# Patient Record
Sex: MALE | Race: WHITE | NOT HISPANIC OR LATINO | Employment: FULL TIME | URBAN - METROPOLITAN AREA
[De-identification: names, ages, dates, MRNs, and addresses within clinical notes are randomized per-mention and may not be internally consistent; named-entity substitution may affect disease eponyms.]

---

## 2022-04-10 ENCOUNTER — APPOINTMENT (EMERGENCY)
Dept: RADIOLOGY | Facility: HOSPITAL | Age: 47
End: 2022-04-10
Payer: COMMERCIAL

## 2022-04-10 ENCOUNTER — HOSPITAL ENCOUNTER (EMERGENCY)
Facility: HOSPITAL | Age: 47
Discharge: HOME | End: 2022-04-11
Attending: EMERGENCY MEDICINE | Admitting: EMERGENCY MEDICINE
Payer: COMMERCIAL

## 2022-04-10 VITALS
HEIGHT: 68 IN | HEART RATE: 78 BPM | DIASTOLIC BLOOD PRESSURE: 74 MMHG | TEMPERATURE: 98.4 F | WEIGHT: 200 LBS | RESPIRATION RATE: 17 BRPM | BODY MASS INDEX: 30.31 KG/M2 | OXYGEN SATURATION: 95 % | SYSTOLIC BLOOD PRESSURE: 149 MMHG

## 2022-04-10 DIAGNOSIS — R07.9 CHEST PAIN: Primary | ICD-10-CM

## 2022-04-10 LAB
ALBUMIN SERPL BCP-MCNC: 4.6 G/DL (ref 3.5–5)
ALP SERPL-CCNC: 56 U/L (ref 46–116)
ALT SERPL W P-5'-P-CCNC: 46 U/L (ref 12–78)
ANION GAP SERPL CALCULATED.3IONS-SCNC: 11 MMOL/L (ref 4–13)
AST SERPL W P-5'-P-CCNC: 27 U/L (ref 5–45)
BASOPHILS # BLD AUTO: 0.03 THOUSANDS/ΜL (ref 0–0.1)
BASOPHILS NFR BLD AUTO: 1 % (ref 0–1)
BILIRUB DIRECT SERPL-MCNC: 0.2 MG/DL (ref 0–0.2)
BILIRUB SERPL-MCNC: 0.64 MG/DL (ref 0.2–1)
BUN SERPL-MCNC: 19 MG/DL (ref 5–25)
CALCIUM SERPL-MCNC: 8.9 MG/DL (ref 8.3–10.1)
CARDIAC TROPONIN I PNL SERPL HS: 3 NG/L
CHLORIDE SERPL-SCNC: 103 MMOL/L (ref 100–108)
CO2 SERPL-SCNC: 28 MMOL/L (ref 21–32)
CREAT SERPL-MCNC: 1.2 MG/DL (ref 0.6–1.3)
EOSINOPHIL # BLD AUTO: 0.03 THOUSAND/ΜL (ref 0–0.61)
EOSINOPHIL NFR BLD AUTO: 1 % (ref 0–6)
ERYTHROCYTE [DISTWIDTH] IN BLOOD BY AUTOMATED COUNT: 12 % (ref 11.6–15.1)
GFR SERPL CREATININE-BSD FRML MDRD: 72 ML/MIN/1.73SQ M
GLUCOSE SERPL-MCNC: 127 MG/DL (ref 65–140)
HCT VFR BLD AUTO: 44.1 % (ref 36.5–49.3)
HGB BLD-MCNC: 15.7 G/DL (ref 12–17)
IMM GRANULOCYTES # BLD AUTO: 0.01 THOUSAND/UL (ref 0–0.2)
IMM GRANULOCYTES NFR BLD AUTO: 0 % (ref 0–2)
LYMPHOCYTES # BLD AUTO: 0.77 THOUSANDS/ΜL (ref 0.6–4.47)
LYMPHOCYTES NFR BLD AUTO: 21 % (ref 14–44)
MCH RBC QN AUTO: 32.2 PG (ref 26.8–34.3)
MCHC RBC AUTO-ENTMCNC: 35.6 G/DL (ref 31.4–37.4)
MCV RBC AUTO: 90 FL (ref 82–98)
MONOCYTES # BLD AUTO: 0.4 THOUSAND/ΜL (ref 0.17–1.22)
MONOCYTES NFR BLD AUTO: 11 % (ref 4–12)
NEUTROPHILS # BLD AUTO: 2.52 THOUSANDS/ΜL (ref 1.85–7.62)
NEUTS SEG NFR BLD AUTO: 66 % (ref 43–75)
NRBC BLD AUTO-RTO: 0 /100 WBCS
PLATELET # BLD AUTO: 153 THOUSANDS/UL (ref 149–390)
PMV BLD AUTO: 9.4 FL (ref 8.9–12.7)
POTASSIUM SERPL-SCNC: 3.9 MMOL/L (ref 3.5–5.3)
PROT SERPL-MCNC: 7.9 G/DL (ref 6.4–8.2)
RBC # BLD AUTO: 4.88 MILLION/UL (ref 3.88–5.62)
SARS-COV-2 AG UPPER RESP QL IA: NORMAL
SODIUM SERPL-SCNC: 142 MMOL/L (ref 136–145)
TSH SERPL DL<=0.05 MIU/L-ACNC: 1.17 UIU/ML (ref 0.45–4.5)
WBC # BLD AUTO: 3.76 THOUSAND/UL (ref 4.31–10.16)

## 2022-04-10 PROCEDURE — 99285 EMERGENCY DEPT VISIT HI MDM: CPT

## 2022-04-10 PROCEDURE — 80048 BASIC METABOLIC PNL TOTAL CA: CPT | Performed by: NURSE PRACTITIONER

## 2022-04-10 PROCEDURE — 87636 SARSCOV2 & INF A&B AMP PRB: CPT | Performed by: NURSE PRACTITIONER

## 2022-04-10 PROCEDURE — 96365 THER/PROPH/DIAG IV INF INIT: CPT

## 2022-04-10 PROCEDURE — 99285 EMERGENCY DEPT VISIT HI MDM: CPT | Performed by: NURSE PRACTITIONER

## 2022-04-10 PROCEDURE — 96375 TX/PRO/DX INJ NEW DRUG ADDON: CPT

## 2022-04-10 PROCEDURE — 85025 COMPLETE CBC W/AUTO DIFF WBC: CPT | Performed by: NURSE PRACTITIONER

## 2022-04-10 PROCEDURE — 36415 COLL VENOUS BLD VENIPUNCTURE: CPT | Performed by: NURSE PRACTITIONER

## 2022-04-10 PROCEDURE — 71045 X-RAY EXAM CHEST 1 VIEW: CPT

## 2022-04-10 PROCEDURE — 84443 ASSAY THYROID STIM HORMONE: CPT | Performed by: NURSE PRACTITIONER

## 2022-04-10 PROCEDURE — 93005 ELECTROCARDIOGRAM TRACING: CPT

## 2022-04-10 PROCEDURE — 84484 ASSAY OF TROPONIN QUANT: CPT | Performed by: NURSE PRACTITIONER

## 2022-04-10 PROCEDURE — 80076 HEPATIC FUNCTION PANEL: CPT | Performed by: NURSE PRACTITIONER

## 2022-04-10 RX ORDER — SODIUM CHLORIDE, SODIUM GLUCONATE, SODIUM ACETATE, POTASSIUM CHLORIDE, MAGNESIUM CHLORIDE, SODIUM PHOSPHATE, DIBASIC, AND POTASSIUM PHOSPHATE .53; .5; .37; .037; .03; .012; .00082 G/100ML; G/100ML; G/100ML; G/100ML; G/100ML; G/100ML; G/100ML
1000 INJECTION, SOLUTION INTRAVENOUS ONCE
Status: COMPLETED | OUTPATIENT
Start: 2022-04-10 | End: 2022-04-10

## 2022-04-10 RX ORDER — SODIUM CHLORIDE 9 MG/ML
3 INJECTION INTRAVENOUS
Status: DISCONTINUED | OUTPATIENT
Start: 2022-04-10 | End: 2022-04-11 | Stop reason: HOSPADM

## 2022-04-10 RX ORDER — LORAZEPAM 1 MG/1
1 TABLET ORAL 3 TIMES DAILY PRN
Qty: 15 TABLET | Refills: 0 | Status: SHIPPED | OUTPATIENT
Start: 2022-04-10 | End: 2022-05-12

## 2022-04-10 RX ORDER — LORAZEPAM 2 MG/ML
1 INJECTION INTRAMUSCULAR ONCE
Status: COMPLETED | OUTPATIENT
Start: 2022-04-10 | End: 2022-04-10

## 2022-04-10 RX ADMIN — SODIUM CHLORIDE, SODIUM GLUCONATE, SODIUM ACETATE, POTASSIUM CHLORIDE, MAGNESIUM CHLORIDE, SODIUM PHOSPHATE, DIBASIC, AND POTASSIUM PHOSPHATE 1000 ML: .53; .5; .37; .037; .03; .012; .00082 INJECTION, SOLUTION INTRAVENOUS at 21:35

## 2022-04-10 RX ADMIN — LORAZEPAM 1 MG: 2 INJECTION INTRAMUSCULAR; INTRAVENOUS at 21:36

## 2022-04-11 LAB
2HR DELTA HS TROPONIN: 0 NG/L
ATRIAL RATE: 81 BPM
CARDIAC TROPONIN I PNL SERPL HS: 3 NG/L
FLUAV RNA RESP QL NAA+PROBE: NEGATIVE
FLUBV RNA RESP QL NAA+PROBE: NEGATIVE
P AXIS: 54 DEGREES
PR INTERVAL: 148 MS
QRS AXIS: 48 DEGREES
QRSD INTERVAL: 96 MS
QT INTERVAL: 386 MS
QTC INTERVAL: 448 MS
SARS-COV-2 RNA RESP QL NAA+PROBE: NEGATIVE
T WAVE AXIS: 27 DEGREES
VENTRICULAR RATE: 81 BPM

## 2022-04-11 PROCEDURE — 93010 ELECTROCARDIOGRAM REPORT: CPT | Performed by: INTERNAL MEDICINE

## 2022-04-11 NOTE — ED PROVIDER NOTES
History  Chief Complaint   Patient presents with    Chest Pain     pt c/o chest pain, fatigue, and sweating x 2 hrs      41-year-old male patient presents here with a chief complaint of chest pain  He is accompanied by his spouse who is contributing to the history  He reports that about 2 hours prior to arrival he suddenly became sweaty and felt extremely fatigued and had a twinge of chest discomfort  He describes he has had on and off chest twitching and muscle twitching that he can not tell if it is his tech muscles or his heart  He reports at times if he concentrates on his breathing this will return  He seems somewhat anxious in his demeanor  He reports he had been drinking more than usual on Friday and Saturday  He reports intermittent numbness of the upper extremities that usually subsides with movement  Today he had some associated numbness that was a little different but also involving the feet      Chest Pain  Associated symptoms: no abdominal pain, no back pain, no cough, no fever, no palpitations, no shortness of breath and not vomiting        None       History reviewed  No pertinent past medical history  History reviewed  No pertinent surgical history  History reviewed  No pertinent family history  I have reviewed and agree with the history as documented  E-Cigarette/Vaping    E-Cigarette Use Never User      E-Cigarette/Vaping Substances     Social History     Tobacco Use    Smoking status: Never Smoker    Smokeless tobacco: Never Used   Vaping Use    Vaping Use: Never used   Substance Use Topics    Alcohol use: Yes    Drug use: Never       Review of Systems   Constitutional: Negative for chills and fever  HENT: Negative for ear pain and sore throat  Eyes: Negative for pain and visual disturbance  Respiratory: Negative for cough and shortness of breath  Cardiovascular: Positive for chest pain  Negative for palpitations     Gastrointestinal: Negative for abdominal pain and vomiting  Genitourinary: Negative for dysuria and hematuria  Musculoskeletal: Negative for arthralgias and back pain  Skin: Negative for color change and rash  Neurological: Negative for seizures and syncope  All other systems reviewed and are negative  Physical Exam  Physical Exam  Vitals and nursing note reviewed  Constitutional:       General: He is not in acute distress  Appearance: He is well-developed  HENT:      Head: Normocephalic and atraumatic  Eyes:      General:         Right eye: No discharge  Left eye: No discharge  Conjunctiva/sclera: Conjunctivae normal    Cardiovascular:      Rate and Rhythm: Normal rate  Pulmonary:      Effort: Pulmonary effort is normal  No respiratory distress  Abdominal:      General: There is no distension  Tenderness: There is no guarding  Musculoskeletal:         General: No deformity  Cervical back: Normal range of motion and neck supple  Skin:     General: Skin is warm and dry  Neurological:      Mental Status: He is alert and oriented to person, place, and time        Coordination: Coordination normal          Vital Signs  ED Triage Vitals   Temperature Pulse Respirations Blood Pressure SpO2   04/10/22 2040 04/10/22 2040 04/10/22 2040 04/10/22 2040 04/10/22 2040   98 4 °F (36 9 °C) 80 20 168/85 99 %      Temp src Heart Rate Source Patient Position - Orthostatic VS BP Location FiO2 (%)   -- 04/10/22 2040 04/10/22 2040 04/10/22 2040 --    Monitor Sitting Left arm       Pain Score       04/10/22 2311       No Pain           Vitals:    04/10/22 2139 04/10/22 2230 04/10/22 2300 04/10/22 2311   BP: 167/81   149/74   Pulse: 77 74 78 78   Patient Position - Orthostatic VS:             Visual Acuity      ED Medications  Medications   LORazepam (ATIVAN) injection 1 mg (1 mg Intravenous Given 4/10/22 2136)   multi-electrolyte (PLASMALYTE-A/ISOLYTE-S PH 7 4) IV solution 1,000 mL (0 mL Intravenous Stopped 4/10/22 2305) Diagnostic Studies  Results Reviewed     Procedure Component Value Units Date/Time    Covid19 and INFLUENZA A/B PCR [[id number]]  (Normal) Collected: 04/10/22 2159    Lab Status: Final result Specimen: Nares from Nose Updated: 04/11/22 1304     SARS-CoV-2 Negative     INFLUENZA A PCR Negative     INFLUENZA B PCR Negative    Narrative:      FOR PEDIATRIC PATIENTS - copy/paste COVID Guidelines URL to browser: [email or web address]  [facility]    SARS-CoV-2 assay is a Nucleic Acid Amplification assay intended for the  qualitative detection of nucleic acid from SARS-CoV-2 in nasopharyngeal  swabs  Results are for the presumptive identification of SARS-CoV-2 RNA  Positive results are indicative of infection with SARS-CoV-2, the virus  causing COVID-19, but do not rule out bacterial infection or co-infection  with other viruses  Laboratories within the [place] and its  territories are required to report all positive results to the appropriate  public health authorities  Negative results do not preclude SARS-CoV-2  infection and should not be used as the sole basis for treatment or other  patient management decisions  Negative results must be combined with  clinical observations, patient history, and epidemiological information  This test has not been FDA cleared or approved  This test has been authorized by FDA under an Emergency Use Authorization  (EUA)  This test is only authorized for the duration of time the  declaration that circumstances exist justifying the authorization of the  emergency use of an in vitro diagnostic tests for detection of SARS-CoV-2  virus and/or diagnosis of COVID-19 infection under section 564(b)(1) of  the Act, 21 U  S C  575ZZR-0(S)(4), unless the authorization is terminated  or revoked sooner  The test has been validated but independent review by FDA  and CLIA is pending  Test performed using the Roche braden [employer] System:  This RT-PCR assay  targets ORF1, a region unique to SARS-CoV-2  A conserved region in the  E-gene was chosen for pan-Sarbecovirus detection which includes  SARS-CoV-2  HS Troponin I 2hr [[id number]]  (Normal) Collected: 04/10/22 2348    Lab Status: Final result Specimen: Blood from Arm, Right Updated: 04/11/22 0020     hs TnI 2hr 3 ng/L      Delta 2hr hsTnI 0 ng/L     COVID Rapid Antigen [[id number]]  (Normal) Collected: 04/10/22 2159    Lab Status: Final result Specimen: Nares from Nose Updated: 04/10/22 2220     SARS-CoV-2 Ag Negative Presumptive    Hepatic function panel [[id number]]  (Normal) Collected: 04/10/22 2117    Lab Status: Final result Specimen: Blood from Arm, Right Updated: 04/10/22 2153     Total Bilirubin 0 64 mg/dL      Bilirubin, Direct 0 20 mg/dL      Alkaline Phosphatase 56 U/L      AST 27 U/L      ALT 46 U/L      Total Protein 7 9 g/dL      Albumin 4 6 g/dL     TSH [[id number]]  (Normal) Collected: 04/10/22 2117    Lab Status: Final result Specimen: Blood from Arm, Right Updated: 04/10/22 2153     TSH 3RD GENERATON 1 174 uIU/mL     Narrative:      Patients undergoing fluorescein dye angiography may retain small amounts of fluorescein in the body for 48-72 hours post procedure  Samples containing fluorescein can produce falsely depressed TSH values  If the patient had this procedure,a specimen should be resubmitted post fluorescein clearance        HS Troponin 0hr (reflex protocol) [[id number]]  (Normal) Collected: 04/10/22 2117    Lab Status: Final result Specimen: Blood from Arm, Right Updated: 04/10/22 2152     hs TnI 0hr 3 ng/L     Basic metabolic panel [[id number]] Collected: 04/10/22 2117    Lab Status: Final result Specimen: Blood from Arm, Right Updated: 04/10/22 2144     Sodium 142 mmol/L      Potassium 3 9 mmol/L      Chloride 103 mmol/L      CO2 28 mmol/L      ANION GAP 11 mmol/L      BUN 19 mg/dL      Creatinine 1 20 mg/dL      Glucose 127 mg/dL      Calcium 8 9 mg/dL      eGFR 72 ml/min/1 73sq m     Narrative:      National Kidney Disease Foundation guidelines for Chronic Kidney Disease (CKD):     Stage 1 with normal or high GFR (GFR > 90 mL/min/1 73 square meters)    Stage 2 Mild CKD (GFR = 60-89 mL/min/1 73 square meters)    Stage 3A Moderate CKD (GFR = 45-59 mL/min/1 73 square meters)    Stage 3B Moderate CKD (GFR = 30-44 mL/min/1 73 square meters)    Stage 4 Severe CKD (GFR = 15-29 mL/min/1 73 square meters)    Stage 5 End Stage CKD (GFR <15 mL/min/1 73 square meters)  Note: GFR calculation is accurate only with a steady state creatinine    CBC and differential [[id number]]  (Abnormal) Collected: 04/10/22 2117    Lab Status: Final result Specimen: Blood from Arm, Right Updated: 04/10/22 2130     WBC 3 76 Thousand/uL      RBC 4 88 Million/uL      Hemoglobin 15 7 g/dL      Hematocrit 44 1 %      MCV 90 fL      MCH 32 2 pg      MCHC 35 6 g/dL      RDW 12 0 %      MPV 9 4 fL      Platelets 144 Thousands/uL      nRBC 0 /100 WBCs      Neutrophils Relative 66 %      Immat GRANS % 0 %      Lymphocytes Relative 21 %      Monocytes Relative 11 %      Eosinophils Relative 1 %      Basophils Relative 1 %      Neutrophils Absolute 2 52 Thousands/µL      Immature Grans Absolute 0 01 Thousand/uL      Lymphocytes Absolute 0 77 Thousands/µL      Monocytes Absolute 0 40 Thousand/µL      Eosinophils Absolute 0 03 Thousand/µL      Basophils Absolute 0 03 Thousands/µL                  X-ray chest 1 view portable   ED Interpretation by GENEVA Moran (04/10 2216)   No acute cardiopulmonary findings  Final Result by Letty Mauricio MD (04/11 1058)      No acute cardiopulmonary disease                    Workstation performed: [id number]                    Procedures  Procedures         ED Course             HEART Risk Score      Most Recent Value   Heart Score Risk Calculator    History 0 Filed at: 04/15/2022 1104   ECG 0 Filed at: 04/15/2022 1104   Age 0 Filed at: 04/15/2022 1104   Risk Factors 1 Filed at: 04/15/2022 1104   Troponin 0 Filed at: 04/15/2022 1104   HEART Score 1 Filed at: 04/15/2022 1104                                      [facility]  Number of Diagnoses or Management Options  Chest pain: new and requires workup     Amount and/or Complexity of Data Reviewed  Clinical lab tests: ordered and reviewed  Tests in the radiology section of CPT®: ordered and reviewed  Tests in the medicine section of CPT®: ordered and reviewed        Disposition  Final diagnoses:   Chest pain     Time reflects when diagnosis was documented in both MDM as applicable and the Disposition within this note     Time User Action Codes Description Comment    4/10/2022 10:52 PM Shelton Stuart Add [R07 9] Chest pain       ED Disposition     ED Disposition Condition Date/Time Comment    Discharge Stable Sun Apr 10, 2022 10:52 PM Mya 1 discharge to home/self care  Follow-up Information     Follow up With Specialties Details Why Contact Info Additional Information    [address] [facility] Emergency Department Emergency Medicine  As needed [address] [facility] [id number] [facility] Emergency Department, [address], [place], [address] [place] [address]    [phone]             Discharge Medication List as of 4/10/2022 10:54 PM      START taking these medications    Details   LORazepam (Ativan) 1 mg tablet Take 1 tablet (1 mg total) by mouth 3 (three) times a day as needed for anxiety for up to 10 days, Starting Sun 4/10/2022, Until Wed 4/20/2022 at 2359, Normal             No discharge procedures on file      PDMP Review     None          ED Provider  Electronically Signed by           GENEVA Quintero  04/15/22 1101

## 2022-04-27 ENCOUNTER — CONSULT (OUTPATIENT)
Dept: SURGERY | Facility: CLINIC | Age: 47
End: 2022-04-27
Payer: COMMERCIAL

## 2022-04-27 VITALS
SYSTOLIC BLOOD PRESSURE: 158 MMHG | DIASTOLIC BLOOD PRESSURE: 80 MMHG | OXYGEN SATURATION: 98 % | TEMPERATURE: 97 F | HEART RATE: 85 BPM | WEIGHT: 197 LBS | BODY MASS INDEX: 29.86 KG/M2 | RESPIRATION RATE: 18 BRPM | HEIGHT: 68 IN

## 2022-04-27 DIAGNOSIS — R22.2 SUBCUTANEOUS MASS OF BACK: Primary | ICD-10-CM

## 2022-04-27 PROCEDURE — 99204 OFFICE O/P NEW MOD 45 MIN: CPT | Performed by: SURGERY

## 2022-04-27 NOTE — PROGRESS NOTES
Assessment/Plan:  He has a large longstanding subcutaneous mass lower back  As per his request I am scheduling him for excision of this mass on May 12th at [facility]  This will be done under IV sedation and local anesthesia  He signed the consent for the surgery  No problem-specific Assessment & Plan notes found for this encounter  Diagnoses and all orders for this visit:    Subcutaneous mass of back          Subjective:      Patient ID: Isabella James is a 40 y o  male  36-year-old male patient came to my office with complaints of a subcutaneous mass  He says it has been there for many years  Recently he says that it is causing him back pain and discomfort  He also thinks that it might have become bigger in size  No complaints of drainage from the mass  No ulceration of the overlying skin  The following portions of the patient's history were reviewed and updated as appropriate:   He  has no past medical history on file  He There are no problems to display for this patient  He  has a past surgical history that includes Tendon repair (01/2007)  His family history includes No Known Problems in his father and mother  He  reports that he has never smoked  He has never used smokeless tobacco  He reports current alcohol use  He reports that he does not use drugs  Current Outpatient Medications   Medication Sig Dispense Refill    LORazepam (Ativan) 1 mg tablet Take 1 tablet (1 mg total) by mouth 3 (three) times a day as needed for anxiety for up to 10 days 15 tablet 0     No current facility-administered medications for this visit  Current Outpatient Medications on File Prior to Visit   Medication Sig    LORazepam (Ativan) 1 mg tablet Take 1 tablet (1 mg total) by mouth 3 (three) times a day as needed for anxiety for up to 10 days     No current facility-administered medications on file prior to visit  He has No Known Allergies       Review of Systems   Constitutional: Negative  HENT: Negative  Eyes: Negative  Respiratory: Negative  Cardiovascular: Negative  Gastrointestinal: Negative  Endocrine: Negative  Genitourinary: Negative  Musculoskeletal: Negative  Skin: Negative  Allergic/Immunologic: Negative  Neurological: Negative  Hematological: Negative  Psychiatric/Behavioral: Negative  Objective:      /80 (BP Location: Left arm, Patient Position: Sitting, Cuff Size: Adult)   Pulse 85   Temp (!) 97 °F (36 1 °C)   Resp 18   Ht 5' 8" (1 727 m)   Wt 89 4 kg (197 lb)   SpO2 98%   BMI 29 95 kg/m²          Physical Exam  Vitals reviewed  Constitutional:       Appearance: Normal appearance  HENT:      Head: Normocephalic and atraumatic  Nose: Nose normal       Mouth/Throat:      Mouth: Mucous membranes are moist    Eyes:      Pupils: Pupils are equal, round, and reactive to light  Cardiovascular:      Rate and Rhythm: Normal rate and regular rhythm  Pulses: Normal pulses  Heart sounds: Normal heart sounds  Pulmonary:      Effort: Pulmonary effort is normal       Breath sounds: Normal breath sounds  Abdominal:      Palpations: Abdomen is soft  Musculoskeletal:         General: Normal range of motion  Cervical back: Normal range of motion  Skin:     General: Skin is warm  Neurological:      General: No focal deficit present  Mental Status: He is alert and oriented to person, place, and time     Psychiatric:         Mood and Affect: Mood normal          Behavior: Behavior normal

## 2022-04-27 NOTE — H&P
Assessment/Plan:  He has a large longstanding subcutaneous mass lower back  As per his request I am scheduling him for excision of this mass on May 12th at [facility]  This will be done under IV sedation and local anesthesia  He signed the consent for the surgery  No problem-specific Assessment & Plan notes found for this encounter  Diagnoses and all orders for this visit:    Subcutaneous mass of back          Subjective:      Patient ID: Lance Espinal is a 40 y o  male  43-year-old male patient came to my office with complaints of a subcutaneous mass  He says it has been there for many years  Recently he says that it is causing him back pain and discomfort  He also thinks that it might have become bigger in size  No complaints of drainage from the mass  No ulceration of the overlying skin  The following portions of the patient's history were reviewed and updated as appropriate:   He  has no past medical history on file  He There are no problems to display for this patient  He  has a past surgical history that includes Tendon repair (01/2007)  His family history includes No Known Problems in his father and mother  He  reports that he has never smoked  He has never used smokeless tobacco  He reports current alcohol use  He reports that he does not use drugs  Current Outpatient Medications   Medication Sig Dispense Refill    LORazepam (Ativan) 1 mg tablet Take 1 tablet (1 mg total) by mouth 3 (three) times a day as needed for anxiety for up to 10 days 15 tablet 0     No current facility-administered medications for this visit  Current Outpatient Medications on File Prior to Visit   Medication Sig    LORazepam (Ativan) 1 mg tablet Take 1 tablet (1 mg total) by mouth 3 (three) times a day as needed for anxiety for up to 10 days     No current facility-administered medications on file prior to visit  He has No Known Allergies       Review of Systems   Constitutional: Negative  HENT: Negative  Eyes: Negative  Respiratory: Negative  Cardiovascular: Negative  Gastrointestinal: Negative  Endocrine: Negative  Genitourinary: Negative  Musculoskeletal: Negative  Skin: Negative  Allergic/Immunologic: Negative  Neurological: Negative  Hematological: Negative  Psychiatric/Behavioral: Negative  Objective:      /80 (BP Location: Left arm, Patient Position: Sitting, Cuff Size: Adult)   Pulse 85   Temp (!) 97 °F (36 1 °C)   Resp 18   Ht 5' 8" (1 727 m)   Wt 89 4 kg (197 lb)   SpO2 98%   BMI 29 95 kg/m²          Physical Exam  Vitals reviewed  Constitutional:       Appearance: Normal appearance  HENT:      Head: Normocephalic and atraumatic  Nose: Nose normal       Mouth/Throat:      Mouth: Mucous membranes are moist    Eyes:      Pupils: Pupils are equal, round, and reactive to light  Cardiovascular:      Rate and Rhythm: Normal rate and regular rhythm  Pulses: Normal pulses  Heart sounds: Normal heart sounds  Pulmonary:      Effort: Pulmonary effort is normal       Breath sounds: Normal breath sounds  Abdominal:      Palpations: Abdomen is soft  Musculoskeletal:         General: Normal range of motion  Cervical back: Normal range of motion  Skin:     General: Skin is warm  Neurological:      General: No focal deficit present  Mental Status: He is alert and oriented to person, place, and time     Psychiatric:         Mood and Affect: Mood normal          Behavior: Behavior normal

## 2022-05-05 ENCOUNTER — ANESTHESIA EVENT (OUTPATIENT)
Dept: PERIOP | Facility: HOSPITAL | Age: 47
End: 2022-05-05
Payer: COMMERCIAL

## 2022-05-05 RX ORDER — MAGNESIUM OXIDE/MAG AA CHELATE 133 MG
1 TABLET ORAL 2 TIMES DAILY
COMMUNITY

## 2022-05-05 RX ORDER — MULTIVITAMIN
1 TABLET ORAL DAILY
COMMUNITY

## 2022-05-05 NOTE — PRE-PROCEDURE INSTRUCTIONS
Pre-Surgery Instructions:   Medication Instructions    LORazepam (Ativan) 1 mg tablet PRN    Multiple Vitamin (multivitamin) tablet Stop taking 7 days prior to surgery   Specialty Vitamins Products (magnesium, amino acid chelate,) 133 MG tablet Stop taking 7 days prior to surgery  My Surgical Experience    The following information was developed to assist you to prepare for your operation  What do I need to do before coming to the hospital?   Arrange for a responsible person to drive you to and from the hospital    Arrange care for your children at home  Children are not allowed in the recovery areas of the hospital   Plan to wear clothing that is easy to put on and take off  If you are having shoulder surgery, wear a shirt that buttons or zippers in the front  Bathing  o Shower the evening before and the morning of your surgery with an antibacterial soap  Please refer to the Pre Op Showering Instructions for Surgery Patients Sheet   o Remove nail polish and all body piercing jewelry  o Do not shave any body part for at least 24 hours before surgery-this includes face, arms, legs and upper body  Food  o Nothing to eat or drink after midnight the night before your surgery  This includes candy and chewing gum  o Exception: If your surgery is after 12:00pm (noon), you may have clear liquids such as 7-Up®, ginger ale, apple or cranberry juice, Jell-O®, water, or clear broth until 8:00 am  o Do not drink milk or juice with pulp on the morning before surgery  o Do not drink alcohol 24 hours before surgery  Medicine  o Follow instructions you received from your surgeon about which medicines you may take on the day of surgery  o If instructed to take medicine on the morning of surgery, take pills with just a small sip of water   Call your prescribing doctor for specific infroamtion on what to do if you take insulin    What should I bring to the hospital?    Bring:  Uvaldo Pacheco or a walker, if you have them, for foot or knee surgery   A list of the daily medicines, vitamins, minerals, herbals and nutritional supplements you take  Include the dosages of medicines and the time you take them each day   Glasses, dentures or hearing aids   Minimal clothing; you will be wearing hospital sleepwear   Photo ID; required to verify your identity   If you have a Living Will or Power of , bring a copy of the documents   If you have an ostomy, bring an extra pouch and any supplies you use    Do not bring   Medicines or inhalers   Money, valuables or jewelry    What other information should I know about the day of surgery?  Notify your surgeons if you develop a cold, sore throat, cough, fever, rash or any other illness   Report to the Ambulatory Surgical/Same Day Surgery Unit   You will be instructed to stop at Registration only if you have not been pre-registered   Inform your  fi they do not stay that they will be asked by the staff to leave a phone number where they can be reached   Be available to be reached before surgery  In the event the operating room schedule changes, you may be asked to come in earlier or later than expected    *It is important to tell your doctor and others involved in your health care if you are taking or have been taking any non-prescription drugs, vitamins, minerals, herbals or other nutritional supplements   Any of these may interact with some food or medicines and cause a reaction

## 2022-05-12 ENCOUNTER — HOSPITAL ENCOUNTER (OUTPATIENT)
Facility: HOSPITAL | Age: 47
Setting detail: OUTPATIENT SURGERY
Discharge: HOME | End: 2022-05-12
Attending: SURGERY | Admitting: SURGERY
Payer: COMMERCIAL

## 2022-05-12 ENCOUNTER — ANESTHESIA (OUTPATIENT)
Dept: PERIOP | Facility: HOSPITAL | Age: 47
End: 2022-05-12
Payer: COMMERCIAL

## 2022-05-12 VITALS
TEMPERATURE: 97.8 F | HEART RATE: 58 BPM | BODY MASS INDEX: 29.02 KG/M2 | RESPIRATION RATE: 16 BRPM | HEIGHT: 68 IN | SYSTOLIC BLOOD PRESSURE: 132 MMHG | DIASTOLIC BLOOD PRESSURE: 69 MMHG | OXYGEN SATURATION: 99 % | WEIGHT: 191.5 LBS

## 2022-05-12 DIAGNOSIS — R22.2 MASS ON BACK: Primary | ICD-10-CM

## 2022-05-12 PROCEDURE — 21931 EXC BACK LES SC 3 CM/>: CPT | Performed by: PHYSICIAN ASSISTANT

## 2022-05-12 PROCEDURE — 88304 TISSUE EXAM BY PATHOLOGIST: CPT | Performed by: PATHOLOGY

## 2022-05-12 PROCEDURE — NC001 PR NO CHARGE: Performed by: PHYSICIAN ASSISTANT

## 2022-05-12 PROCEDURE — 21931 EXC BACK LES SC 3 CM/>: CPT | Performed by: SURGERY

## 2022-05-12 PROCEDURE — NC001 PR NO CHARGE: Performed by: SURGERY

## 2022-05-12 RX ORDER — PROPOFOL 10 MG/ML
INJECTION, EMULSION INTRAVENOUS CONTINUOUS PRN
Status: DISCONTINUED | OUTPATIENT
Start: 2022-05-12 | End: 2022-05-12

## 2022-05-12 RX ORDER — SODIUM CHLORIDE, SODIUM LACTATE, POTASSIUM CHLORIDE, CALCIUM CHLORIDE 600; 310; 30; 20 MG/100ML; MG/100ML; MG/100ML; MG/100ML
125 INJECTION, SOLUTION INTRAVENOUS CONTINUOUS
Status: DISCONTINUED | OUTPATIENT
Start: 2022-05-12 | End: 2022-05-12 | Stop reason: HOSPADM

## 2022-05-12 RX ORDER — FENTANYL CITRATE/PF 50 MCG/ML
25 SYRINGE (ML) INJECTION
Status: DISCONTINUED | OUTPATIENT
Start: 2022-05-12 | End: 2022-05-12 | Stop reason: HOSPADM

## 2022-05-12 RX ORDER — HYDROMORPHONE HCL/PF 1 MG/ML
0.5 SYRINGE (ML) INJECTION
Status: DISCONTINUED | OUTPATIENT
Start: 2022-05-12 | End: 2022-05-12 | Stop reason: HOSPADM

## 2022-05-12 RX ORDER — KETAMINE HCL IN NACL, ISO-OSM 100MG/10ML
SYRINGE (ML) INJECTION AS NEEDED
Status: DISCONTINUED | OUTPATIENT
Start: 2022-05-12 | End: 2022-05-12

## 2022-05-12 RX ORDER — MIDAZOLAM HYDROCHLORIDE 2 MG/2ML
INJECTION, SOLUTION INTRAMUSCULAR; INTRAVENOUS AS NEEDED
Status: DISCONTINUED | OUTPATIENT
Start: 2022-05-12 | End: 2022-05-12

## 2022-05-12 RX ORDER — GLYCOPYRROLATE 0.2 MG/ML
INJECTION INTRAMUSCULAR; INTRAVENOUS AS NEEDED
Status: DISCONTINUED | OUTPATIENT
Start: 2022-05-12 | End: 2022-05-12

## 2022-05-12 RX ORDER — LIDOCAINE HYDROCHLORIDE 10 MG/ML
0.5 INJECTION, SOLUTION EPIDURAL; INFILTRATION; INTRACAUDAL; PERINEURAL ONCE AS NEEDED
Status: DISCONTINUED | OUTPATIENT
Start: 2022-05-12 | End: 2022-05-12 | Stop reason: HOSPADM

## 2022-05-12 RX ORDER — OXYCODONE HYDROCHLORIDE AND ACETAMINOPHEN 5; 325 MG/1; MG/1
1 TABLET ORAL EVERY 4 HOURS PRN
Qty: 5 TABLET | Refills: 0 | Status: SHIPPED | OUTPATIENT
Start: 2022-05-12 | End: 2022-05-17

## 2022-05-12 RX ORDER — LIDOCAINE HYDROCHLORIDE 10 MG/ML
INJECTION, SOLUTION EPIDURAL; INFILTRATION; INTRACAUDAL; PERINEURAL AS NEEDED
Status: DISCONTINUED | OUTPATIENT
Start: 2022-05-12 | End: 2022-05-12

## 2022-05-12 RX ORDER — CEFAZOLIN SODIUM 2 G/50ML
SOLUTION INTRAVENOUS AS NEEDED
Status: DISCONTINUED | OUTPATIENT
Start: 2022-05-12 | End: 2022-05-12

## 2022-05-12 RX ORDER — HYDROCODONE BITARTRATE AND ACETAMINOPHEN 5; 325 MG/1; MG/1
1 TABLET ORAL EVERY 6 HOURS PRN
Status: DISCONTINUED | OUTPATIENT
Start: 2022-05-12 | End: 2022-05-12 | Stop reason: HOSPADM

## 2022-05-12 RX ORDER — KETOROLAC TROMETHAMINE 30 MG/ML
INJECTION, SOLUTION INTRAMUSCULAR; INTRAVENOUS AS NEEDED
Status: DISCONTINUED | OUTPATIENT
Start: 2022-05-12 | End: 2022-05-12

## 2022-05-12 RX ORDER — CEFAZOLIN SODIUM 2 G/50ML
2000 SOLUTION INTRAVENOUS ONCE
Status: DISCONTINUED | OUTPATIENT
Start: 2022-05-12 | End: 2022-05-12 | Stop reason: HOSPADM

## 2022-05-12 RX ORDER — FENTANYL CITRATE 50 UG/ML
INJECTION, SOLUTION INTRAMUSCULAR; INTRAVENOUS AS NEEDED
Status: DISCONTINUED | OUTPATIENT
Start: 2022-05-12 | End: 2022-05-12

## 2022-05-12 RX ORDER — PROPOFOL 10 MG/ML
INJECTION, EMULSION INTRAVENOUS AS NEEDED
Status: DISCONTINUED | OUTPATIENT
Start: 2022-05-12 | End: 2022-05-12

## 2022-05-12 RX ORDER — ONDANSETRON 2 MG/ML
4 INJECTION INTRAMUSCULAR; INTRAVENOUS EVERY 6 HOURS PRN
Status: DISCONTINUED | OUTPATIENT
Start: 2022-05-12 | End: 2022-05-12 | Stop reason: HOSPADM

## 2022-05-12 RX ADMIN — FENTANYL CITRATE 25 MCG: 50 INJECTION, SOLUTION INTRAMUSCULAR; INTRAVENOUS at 14:00

## 2022-05-12 RX ADMIN — MIDAZOLAM 2 MG: 1 INJECTION INTRAMUSCULAR; INTRAVENOUS at 13:41

## 2022-05-12 RX ADMIN — LIDOCAINE HYDROCHLORIDE 50 MG: 10 INJECTION, SOLUTION EPIDURAL; INFILTRATION; INTRACAUDAL at 13:49

## 2022-05-12 RX ADMIN — Medication 30 MG: at 13:50

## 2022-05-12 RX ADMIN — PROPOFOL 50 MG: 10 INJECTION, EMULSION INTRAVENOUS at 13:49

## 2022-05-12 RX ADMIN — FENTANYL CITRATE 50 MCG: 50 INJECTION, SOLUTION INTRAMUSCULAR; INTRAVENOUS at 14:14

## 2022-05-12 RX ADMIN — KETOROLAC TROMETHAMINE 15 MG: 30 INJECTION, SOLUTION INTRAMUSCULAR at 14:08

## 2022-05-12 RX ADMIN — PROPOFOL 100 MCG/KG/MIN: 10 INJECTION, EMULSION INTRAVENOUS at 13:49

## 2022-05-12 RX ADMIN — GLYCOPYRROLATE 0.2 MCG: 0.2 INJECTION, SOLUTION INTRAMUSCULAR; INTRAVENOUS at 13:41

## 2022-05-12 RX ADMIN — FENTANYL CITRATE 25 MCG: 50 INJECTION, SOLUTION INTRAMUSCULAR; INTRAVENOUS at 13:52

## 2022-05-12 RX ADMIN — SODIUM CHLORIDE, SODIUM LACTATE, POTASSIUM CHLORIDE, AND CALCIUM CHLORIDE 125 ML/HR: .6; .31; .03; .02 INJECTION, SOLUTION INTRAVENOUS at 12:15

## 2022-05-12 RX ADMIN — CEFAZOLIN SODIUM 2000 MG: 2 SOLUTION INTRAVENOUS at 13:50

## 2022-05-12 NOTE — ANESTHESIA POSTPROCEDURE EVALUATION
Post-Op Assessment Note    CV Status:  Stable  Pain Score: 0    Pain management: adequate     Mental Status:  Alert and awake   Hydration Status:  Euvolemic   PONV Controlled:  Controlled   Airway Patency:  Patent      Post Op Vitals Reviewed: Yes      Staff: CRNA, Anesthesiologist         No complications documented      BP   137/67   Temp  97 4   Pulse  86   Resp  16   SpO2   99

## 2022-05-12 NOTE — H&P
Assessment/Plan:  He has a large longstanding subcutaneous mass lower back  As per his request I am scheduling him for excision of this mass on May 12th at [facility]  This will be done under IV sedation and local anesthesia  He signed the consent for the surgery      No problem-specific Assessment & Plan notes found for this encounter          Diagnoses and all orders for this visit:     Subcutaneous mass of back            Subjective:       Patient ID: Melina An is a 40 y o  male      25-year-old male patient came to my office with complaints of a subcutaneous mass  He says it has been there for many years  Recently he says that it is causing him back pain and discomfort  He also thinks that it might have become bigger in size  No complaints of drainage from the mass  No ulceration of the overlying skin         The following portions of the patient's history were reviewed and updated as appropriate:   He  has no past medical history on file  He There are no problems to display for this patient      He  has a past surgical history that includes Tendon repair (01/2007)  His family history includes No Known Problems in his father and mother  He  reports that he has never smoked  He has never used smokeless tobacco  He reports current alcohol use  He reports that he does not use drugs  Current Outpatient Medications   Medication Sig Dispense Refill    LORazepam (Ativan) 1 mg tablet Take 1 tablet (1 mg total) by mouth 3 (three) times a day as needed for anxiety for up to 10 days 15 tablet 0      No current facility-administered medications for this visit            Current Outpatient Medications on File Prior to Visit   Medication Sig    LORazepam (Ativan) 1 mg tablet Take 1 tablet (1 mg total) by mouth 3 (three) times a day as needed for anxiety for up to 10 days      No current facility-administered medications on file prior to visit       He has No Known Allergies        Review of Systems   Constitutional: Negative  HENT: Negative  Eyes: Negative  Respiratory: Negative  Cardiovascular: Negative  Gastrointestinal: Negative  Endocrine: Negative  Genitourinary: Negative  Musculoskeletal: Negative  Skin: Negative  Allergic/Immunologic: Negative  Neurological: Negative  Hematological: Negative  Psychiatric/Behavioral: Negative            Objective:        /80 (BP Location: Left arm, Patient Position: Sitting, Cuff Size: Adult)   Pulse 85   Temp (!) 97 °F (36 1 °C)   Resp 18   Ht 5' 8" (1 727 m)   Wt 89 4 kg (197 lb)   SpO2 98%   BMI 29 95 kg/m²             Physical Exam  Vitals reviewed  Constitutional:       Appearance: Normal appearance  HENT:      Head: Normocephalic and atraumatic  Nose: Nose normal       Mouth/Throat:      Mouth: Mucous membranes are moist    Eyes:      Pupils: Pupils are equal, round, and reactive to light  Cardiovascular:      Rate and Rhythm: Normal rate and regular rhythm  Pulses: Normal pulses  Heart sounds: Normal heart sounds  Pulmonary:      Effort: Pulmonary effort is normal       Breath sounds: Normal breath sounds  Abdominal:      Palpations: Abdomen is soft  Musculoskeletal:         General: Normal range of motion  Cervical back: Normal range of motion  Skin:     General: Skin is warm  Neurological:      General: No focal deficit present  Mental Status: He is alert and oriented to person, place, and time     Psychiatric:         Mood and Affect: Mood normal          Behavior: Behavior normal

## 2022-05-12 NOTE — OP NOTE
OPERATIVE REPORT  PATIENT NAME: Clive Gross    :  1975  MRN: [id number]  Pt Location: [facility] OR ROOM 02    SURGERY DATE: 2022    Surgeon(s) and Role: * Marlon Vargas MD - Primary     * Ira Patel PA-C - Assisting    Preop Diagnosis:  Mass on back [R22 2]    Post-Op Diagnosis Codes:     * Mass on back [R22 2]    Procedure:    Excision mass subcutaneous back 6 cm  [id number]    Specimen(s):  ID Type Source Tests Collected by Time Destination   1 : back mass Tissue Back TISSUE EXAM Marlon Vargas MD 2022 [id number]        Estimated Blood Loss:   Minimal    Drains:  * No LDAs found *    Anesthesia Type:   IV Sedation with Anesthesia    Operative Indications: Mass on back [R22 2]      Operative Findings:  6 cm by 6 cm subcutaneous mass lower back  Complications:   None    Procedure and Technique:  The patient was brought to the operating room and identified correctly by myself and the operating room staff  He was placed in prone position  IV sedation was given  Parts were prepped and draped in standard fashion  Local anesthetic was given in the area surrounding the mass  A transverse incision was made over the mass  Flaps were raised subcutaneously in the superior and inferior direction  The mass was grabbed with a Allis forceps and was dissected from the surrounding tissue using electrocautery  It is excised in its entirety along with the capsule  Irrigation of the cavity was performed  Hemostasis was maintained using electrocautery  The incision was closed in 2 layers  The subcutaneous layer was closed using 3-0 Vicryl suture interrupted  The skin was closed with running subcuticular 4-0 Monocryl  Surgical glue was applied  Patient was recovered from IV sedation placed in supine position and taken to recovery under stable condition     I was present for the entire procedure, A qualified resident physician was not available and A physician assistant was required during the procedure for retraction tissue handling,dissection and suturing    Patient Disposition:  PACU       SIGNATURE: Jyotsna Kumar MD  DATE: May 12, 2022  TIME: 2:48 PM

## 2022-05-12 NOTE — ANESTHESIA PREPROCEDURE EVALUATION
Procedure:  EXCISION  MASS 12CM  BACK (N/A Back)    Relevant Problems   No relevant active problems        Physical Exam    Airway    Mallampati score: II  TM Distance: >3 FB  Neck ROM: full     Dental   No notable dental hx     Cardiovascular  Rhythm: regular, Rate: normal, Cardiovascular exam normal    Pulmonary  Pulmonary exam normal Breath sounds clear to auscultation,     Other Findings        Anesthesia Plan  ASA Score- 1     Anesthesia Type- IV sedation with anesthesia with ASA Monitors  Additional Monitors:   Airway Plan:     Comment: Discussed risks/benefits, including medication reactions, awareness, aspiration, and serious/life threatening complications  Plan to maintain native airway with IVGA, monitored with EtCO2  Plan Factors-Exercise tolerance (METS): >4 METS  Patient summary reviewed  Patient instructed to abstain from smoking on day of procedure  Patient did not smoke on day of surgery  Induction- intravenous  Postoperative Plan-     Informed Consent- Anesthetic plan and risks discussed with patient  I personally reviewed this patient with the CRNA  Discussed and agreed on the Anesthesia Plan with the CRNA  Prince Hutchins

## 2022-05-16 ENCOUNTER — TELEPHONE (OUTPATIENT)
Dept: SURGERY | Facility: CLINIC | Age: 47
End: 2022-05-16

## 2022-05-16 NOTE — TELEPHONE ENCOUNTER
Post surgical call from 5/12 at [facility]- Patient stated everything went well, and has no questions or concerns at this time  Post -op appt  Was made a this time

## 2022-05-18 ENCOUNTER — OFFICE VISIT (OUTPATIENT)
Dept: SURGERY | Facility: CLINIC | Age: 47
End: 2022-05-18

## 2022-05-18 VITALS
WEIGHT: 194 LBS | DIASTOLIC BLOOD PRESSURE: 92 MMHG | TEMPERATURE: 98.5 F | BODY MASS INDEX: 29.4 KG/M2 | OXYGEN SATURATION: 99 % | SYSTOLIC BLOOD PRESSURE: 160 MMHG | HEIGHT: 68 IN | RESPIRATION RATE: 16 BRPM | HEART RATE: 90 BPM

## 2022-05-18 DIAGNOSIS — R22.2 SUBCUTANEOUS MASS OF BACK: Primary | ICD-10-CM

## 2022-05-18 PROCEDURE — 99024 POSTOP FOLLOW-UP VISIT: CPT | Performed by: SURGERY

## 2022-05-18 NOTE — PROGRESS NOTES
Assessment/Plan:  I explained to the patient that he has a seroma  This seroma may follow either of the 2 sequelae  It may drain out spontaneously or it may slowly get reabsorbed over time  I explained to him that if it drains out he should call me  The pathology results are not back yet  Otherwise I am going to see him in 2 weeks  No problem-specific Assessment & Plan notes found for this encounter  Diagnoses and all orders for this visit:    Subcutaneous mass of back          Subjective:      Patient ID: Debby Sloan is a 40 y o  male  73-year-old male who is 6 days status post excision of a subcutaneous mass which looked like a lipoma from his lower back  He has no complaints  He describes his pain to be 1/10  No drainage from the incision  No fever  The following portions of the patient's history were reviewed and updated as appropriate: allergies, current medications, past family history, past medical history, past social history, past surgical history and problem list     Review of Systems   All other systems reviewed and are negative  Objective:      /92 (BP Location: Left arm, Patient Position: Sitting, Cuff Size: Standard)   Pulse 90   Temp 98 5 °F (36 9 °C) (Tympanic)   Resp 16   Ht 5' 8" (1 727 m)   Wt 88 kg (194 lb)   SpO2 99%   BMI 29 50 kg/m²          Physical Exam  Constitutional:       Appearance: Normal appearance  Pulmonary:       Neurological:      Mental Status: He is alert

## 2022-05-31 RX ORDER — CEPHALEXIN 500 MG/1
1000 CAPSULE ORAL 2 TIMES DAILY
COMMUNITY
Start: 2022-05-22 | End: 2022-06-01

## 2022-06-01 ENCOUNTER — OFFICE VISIT (OUTPATIENT)
Dept: SURGERY | Facility: CLINIC | Age: 47
End: 2022-06-01

## 2022-06-01 VITALS
OXYGEN SATURATION: 98 % | DIASTOLIC BLOOD PRESSURE: 82 MMHG | HEIGHT: 68 IN | RESPIRATION RATE: 16 BRPM | TEMPERATURE: 97.6 F | BODY MASS INDEX: 29.73 KG/M2 | HEART RATE: 78 BPM | WEIGHT: 196.2 LBS | SYSTOLIC BLOOD PRESSURE: 136 MMHG

## 2022-06-01 DIAGNOSIS — R22.2 SUBCUTANEOUS MASS OF BACK: Primary | ICD-10-CM

## 2022-06-01 PROCEDURE — 99024 POSTOP FOLLOW-UP VISIT: CPT | Performed by: SURGERY

## 2022-06-01 NOTE — PROGRESS NOTES
Assessment/Plan:    No problem-specific Assessment & Plan notes found for this encounter  {Assess/PlanSmartLinks:48359}      Subjective:      Patient ID: Radha Ambrosio is a 40 y o  male      HPI    {Common ambulatory SmartLinks:93210}    Review of Systems      Objective:      /82 (BP Location: Left arm, Patient Position: Sitting, Cuff Size: Standard)   Pulse 78   Temp 97 6 °F (36 4 °C) (Tympanic)   Resp 16   Ht 5' 8" (1 727 m)   Wt 89 kg (196 lb 3 2 oz)   SpO2 98%   BMI 29 83 kg/m²          Physical Exam

## 2022-06-01 NOTE — PROGRESS NOTES
Assessment/Plan:  I dressed the area and told him that he likely had a seroma/hematoma which drained spontaneously  There is no infection at present  He has already taken 10 days of oral antibiotics  He should give me a call if there is purulent drainage, increased redness or pain  I am going to see him again in 1 week or p r n  No problem-specific Assessment & Plan notes found for this encounter  Diagnoses and all orders for this visit:    Subcutaneous mass of back    Other orders  -     cephalexin (KEFLEX) 500 mg capsule; Take 1,000 mg by mouth 2 (two) times a day (Patient not taking: Reported on 6/1/2022)          Subjective:      Patient ID: Shamar Case is a 40 y o  male  52-year-old male patient who is 3 weeks status post excision of a large lipoma from his lower back  He developed a seroma which drained spontaneously  The patient was worried and he went to the ER 10 days ago  He took 10 days of oral antibiotics  He says that the drainage now is very minimum  No fever  No pain  The following portions of the patient's history were reviewed and updated as appropriate: allergies, current medications, past family history, past medical history, past social history, past surgical history and problem list     Review of Systems   All other systems reviewed and are negative  Objective:      /82 (BP Location: Left arm, Patient Position: Sitting, Cuff Size: Standard)   Pulse 78   Temp 97 6 °F (36 4 °C) (Tympanic)   Resp 16   Ht 5' 8" (1 727 m)   Wt 89 kg (196 lb 3 2 oz)   SpO2 98%   BMI 29 83 kg/m²          Physical Exam  Vitals reviewed  Musculoskeletal:      Comments: His back incision looks mostly healed  On the right side there is a small opening and on pressing on the whole incision I was able to express a drop of bloody fluid  No cellulitis

## 2022-06-08 ENCOUNTER — OFFICE VISIT (OUTPATIENT)
Dept: SURGERY | Facility: CLINIC | Age: 47
End: 2022-06-08

## 2022-06-08 VITALS
HEART RATE: 60 BPM | BODY MASS INDEX: 29.55 KG/M2 | DIASTOLIC BLOOD PRESSURE: 79 MMHG | SYSTOLIC BLOOD PRESSURE: 136 MMHG | WEIGHT: 195 LBS | TEMPERATURE: 97.2 F | OXYGEN SATURATION: 99 % | HEIGHT: 68 IN

## 2022-06-08 DIAGNOSIS — R22.2 SUBCUTANEOUS MASS OF BACK: ICD-10-CM

## 2022-06-08 DIAGNOSIS — L76.34 POSTOPERATIVE SEROMA OF SUBCUTANEOUS TISSUE AFTER NON-DERMATOLOGIC PROCEDURE: Primary | ICD-10-CM

## 2022-06-08 PROCEDURE — 99024 POSTOP FOLLOW-UP VISIT: CPT | Performed by: SURGERY

## 2022-06-08 RX ORDER — AMOXICILLIN AND CLAVULANATE POTASSIUM 875; 125 MG/1; MG/1
1 TABLET, FILM COATED ORAL EVERY 12 HOURS SCHEDULED
Qty: 10 TABLET | Refills: 0 | Status: SHIPPED | OUTPATIENT
Start: 2022-06-08 | End: 2022-06-13

## 2022-06-08 NOTE — PROGRESS NOTES
36-year-old male patient who is 4 weeks status post excision of a large subcutaneous lipoma from his back  Postoperatively he developed a seroma  When I saw him last week the seroma was completely drained spontaneously  Tells me today that he feels a small swelling in the area of the incision  No fever  No pain  On clinical exam he is alert awake oriented vitals are stable  There is a small reaccumulated seroma  The opening at the site of the incision is partially closed  I used a Q-tip to open that area and squeezed out approximately 10 mL of seroma fluid  The incision was cleaned and dressing was performed  I told the patient that I am going to give him 5 weeks of Augmentin  I told him to continue doing the dressing daily  I am going to see him in 1 week or jorge Hart

## (undated) DEVICE — INTENDED FOR TISSUE SEPARATION, AND OTHER PROCEDURES THAT REQUIRE A SHARP SURGICAL BLADE TO PUNCTURE OR CUT.: Brand: BARD-PARKER SAFETY BLADES SIZE 15, STERILE

## (undated) DEVICE — ADHESIVE SKIN HIGH VISCOSITY EXOFIN 1ML

## (undated) DEVICE — NEEDLE 25G X 1 1/2

## (undated) DEVICE — GLOVE SRG BIOGEL ECLIPSE 7.5

## (undated) DEVICE — PAD GROUNDING ADULT

## (undated) DEVICE — MEDI-VAC YANK SUCT HNDL W/TPRD BULBOUS TIP: Brand: CARDINAL HEALTH

## (undated) DEVICE — CHLORAPREP HI-LITE 26ML ORANGE

## (undated) DEVICE — SUT MONOCRYL 4-0 PS-2 18 IN Y496G

## (undated) DEVICE — OCCLUSIVE GAUZE STRIP,3% BISMUTH TRIBROMOPHENATE IN PETROLATUM BLEND: Brand: XEROFORM

## (undated) DEVICE — PLUMEPEN PRO 10FT

## (undated) DEVICE — 3M™ TEGADERM™ TRANSPARENT FILM DRESSING FRAME STYLE, 1626W, 4 IN X 4-3/4 IN (10 CM X 12 CM), 50/CT 4CT/CASE: Brand: 3M™ TEGADERM™

## (undated) DEVICE — BETHLEHEM UNIVERSAL MINOR GEN: Brand: CARDINAL HEALTH

## (undated) DEVICE — GLOVE INDICATOR PI UNDERGLOVE SZ 8 BLUE

## (undated) DEVICE — SUT VICRYL 2-0 SH 27 IN UNDYED J417H